# Patient Record
Sex: FEMALE | Race: WHITE | NOT HISPANIC OR LATINO | Employment: OTHER | RURAL
[De-identification: names, ages, dates, MRNs, and addresses within clinical notes are randomized per-mention and may not be internally consistent; named-entity substitution may affect disease eponyms.]

---

## 2018-04-12 ENCOUNTER — OFFICE VISIT CONVERTED (OUTPATIENT)
Dept: CARDIOLOGY | Facility: CLINIC | Age: 63
End: 2018-04-12
Attending: SPECIALIST

## 2018-04-12 ENCOUNTER — CONVERSION ENCOUNTER (OUTPATIENT)
Dept: CARDIOLOGY | Facility: CLINIC | Age: 63
End: 2018-04-12

## 2018-11-08 ENCOUNTER — OFFICE VISIT CONVERTED (OUTPATIENT)
Dept: CARDIOLOGY | Facility: CLINIC | Age: 63
End: 2018-11-08
Attending: SPECIALIST

## 2019-05-09 ENCOUNTER — CONVERSION ENCOUNTER (OUTPATIENT)
Dept: CARDIOLOGY | Facility: CLINIC | Age: 64
End: 2019-05-09

## 2019-05-09 ENCOUNTER — OFFICE VISIT CONVERTED (OUTPATIENT)
Dept: CARDIOLOGY | Facility: CLINIC | Age: 64
End: 2019-05-09
Attending: SPECIALIST

## 2019-11-07 ENCOUNTER — CONVERSION ENCOUNTER (OUTPATIENT)
Dept: CARDIOLOGY | Facility: CLINIC | Age: 64
End: 2019-11-07

## 2019-11-07 ENCOUNTER — OFFICE VISIT CONVERTED (OUTPATIENT)
Dept: CARDIOLOGY | Facility: CLINIC | Age: 64
End: 2019-11-07
Attending: SPECIALIST

## 2020-12-17 ENCOUNTER — OFFICE VISIT CONVERTED (OUTPATIENT)
Dept: CARDIOLOGY | Facility: CLINIC | Age: 65
End: 2020-12-17
Attending: SPECIALIST

## 2021-02-02 ENCOUNTER — CONVERSION ENCOUNTER (OUTPATIENT)
Dept: CARDIOLOGY | Facility: CLINIC | Age: 66
End: 2021-02-02
Attending: SPECIALIST

## 2021-05-10 NOTE — PROCEDURES
"   Procedure Note      Patient Name: Emperatriz Mccarthy   Patient ID: [id number]   Sex: Female   YOB: 1955    Primary Care Provider: Cristin CELESTIN   Referring Provider: Cristin CELESTIN    Visit Date: February 2, 2021    Provider: Gerardo Doty MD   Location: [facility]   Location Address: [address] [place] [address], [address]   OZZY Govea  [id number]   Location Phone: [phone]          FINAL REPORT   TRANSTHORACIC ECHOCARDIOGRAM REPORT    Diagnosis: Aortic Stenosis   Height: 5'5\" Weight: 350 B/P: 116/40 BSA: 2.5   Tech: BNS   MEASUREMENTS:  RVID (Diastole) : RVID. (NORMAL: 0.7 to 2.4 cm max)   LVID (Systole): 3.8 cm (Diastole): 6.5 cm . (NORMAL: 3.7 - 5.4 cm)   Posterior Wall Thickness (Diastole): 1.1 cm. (NORMAL: 0.8 - 1.1 cm)   Septal Thickness (Diastole): 1.1 cm. (NORMAL: 0.7 - 1.2 cm)   LAID (Systole): 4.0 cm. (NORMAL: 1.9 - 3.8 cm)   Aortic Root Diameter (Diastole): 2.4 cm. (NORMAL: 2.0 - 3.7 cm)   DOPPLER:  E/A ratio 1.3 (NORMAL 0.8-2.0)   DT: 201 msec (NORMAL 140-240 msec.)   IVRT 97 m/sec (NORMAL  m/sec.)   E/E': 20 (NORMAL <8 avg.)   COMMENTS:  The patient underwent 2-D, M-Mode, and Doppler examination, including pulse-wave, continuous-wave, and color-flow analysis; the study is technically limited. Lumason contrast was used for better left ventricle endocardial definition. The patient was administered 2 mL of 5 mL Lumason intravenously.   FINDINGS:  AORTIC VALVE: Fibrocalcific.   MITRAL VALVE: Fibrocalcific.   TRICUSPID VALVE: Normal.   PULMONIC VALVE: Not well visualized.   LEFT ATRIUM: Enlarged. No intracavitary masses or clots seen. LA volume index is 35 mL/m2.   AORTIC ROOT: Normal in size with adequate motion.   LEFT VENTRICLE: Mild left ventricular hypertrophy with slightly enlarged left ventricle. Normal left ventricular systolic function. Ejection fraction 60%.   RIGHT ATRIUM: Normal.   RIGHT VENTRICLE: Normal size and function.   PERICARDIUM: Unremarkable. No " evidence of effusion.   INFERIOR VENA CAVA: Diameter is 1.5 cm.   DOPPLER: Doppler examination of the aortic, mitral, tricuspid, and pulmonary valves was performed. Normal pulmonary artery systolic pressure by Doppler. There is increased velocity across the aortic valve with peak gradient of 42 mm, mean gradient of 24 mm suggestive of moderately severe aortic stenosis. There is mild mitral regurgitation and mild tricuspid regurgitation.   Faxed: 02/03/2021      CONCLUSION:  1.  Left ventricular hypertrophy with slightly enlarged left ventricle and normal left ventricular systolic function.   2.  Moderately severe aortic stenosis.  3.  Mild aortic regurgitation.   4.  Mild mitral regurgitation.   5.  Enlarged left atrium.      Gerardo Doty MD  KAYLA/pap                 Electronically Signed by: Mira Adamson-, Other -Author on February 3, 2021 10:58:49 AM  Electronically Co-signed by: Gerardo Doty MD -Reviewer on February 8, 2021 09:08:45 AM

## 2021-05-14 VITALS
SYSTOLIC BLOOD PRESSURE: 116 MMHG | BODY MASS INDEX: 48.82 KG/M2 | DIASTOLIC BLOOD PRESSURE: 40 MMHG | HEIGHT: 65 IN | WEIGHT: 293 LBS | HEART RATE: 82 BPM

## 2021-05-14 NOTE — PROGRESS NOTES
Progress Note      Patient Name: Emperatriz Mccarthy   Patient ID: [id number]   Sex: Female   YOB: 1955    Primary Care Provider: Cristin CELESTIN   Referring Provider: Cristin CELESTIN    Visit Date: December 17, 2020    Provider: Gerardo Doty MD   Location: [facility]   Location Address: [address] [place] [address]  [id number]   Location Phone: [phone]          Chief Complaint  · Valvular heart disease  · Aortic valve stenosis  · Shortness of breath   · Morbid obesity      History Of Present Illness  Emperatriz Mccarthy is a 65 year old obese /White female with a history of aortic valve stenosis; palpitations. Patient denies chest pain. Shortness of breath is present but stable. Recent echocardiogram in [facility] is sub-optimal and a ABILIO was suggested .   Medications  Meds at present include: Allopurinol 300 mg qd; Vitamin D-2-50,000 units q-week; Diltiazem  mg qd; Escitalopram 20 mg qd; Glipizide 10 mg 2 tablets qpm; Lisinopril 5 mg qd; Magnesium Oxide 400 mg bid; Montelukast 10 mg qd; Potassium Chloride 10 meq qd; Atorvastatin 20 mg qd; Torsemide 100 mg 1/2 tablet qd; Metformin 1000 mg bid; Januvia 100 mg qd; Trazodone 50 mg; Amitriptyline 10 mg; Oxcarbazepine 150 mg 2 tablets qhs. C-Pap prn; B-12 vitamin 1000 mg; Levothyroxine 25 mcg qam; ASA 81 mg qd.   PAST MEDICAL HISTORY: positive for diabetes and hypertension; negative for chest pain.   PSYCHO/SOCIAL HISTORY: does not drink alcohol and does not smoke.       Review of Systems  · Cardiovascular  o Admits  o : shortness of breath  o Denies  o : palpitations (fast, fluttering, or skipping beats), swelling (feet, ankles, hands), chest pain or angina pectoris  · Respiratory  o Denies  o : chronic or frequent cough, asthma or wheezing      Vitals  Date Time BP Position Site L\R Cuff Size HR RR TEMP (F) WT  HT  BMI kg/m2 BSA m2 O2 Sat FR L/min FiO2 HC       12/17/2020 11:34 AM  "116/40 Sitting    82 - R   349lbs 16oz 5'  5\" 58.24 2.7             Physical Examination  · Constitutional  o Appearance  o : Alert and Oriented X3. The patient is morbidly obese.  · Respiratory  o Inspection of Chest  o : No chest wall deformities, moving equal  o Auscultation of Lungs  o : Good air entry with vesicular breath sounds  · Cardiovascular  o Heart  o :   § Auscultation of Heart  § : S1 and S2 are regular. No S3. No S4. Systolic murmur grade 3/6 present in the aotic area.   o Peripheral Vascular System  o :   § Extremities  § : Peripheral pulses were well felt. No edema. No cyanosis.  · Gastrointestinal  o Abdominal Examination  o : No masses or tenderness noted.           Assessment     IMPRESSION/PLAN    1. Morbid obesity. I asked her to be on a low fat diet and try to lose weight.  2. Nonrheumatic aortic valve stenosis. Will repeat an Echocardiogram and see if we can get a technical adequate study to avoid Transesophageal Echocardiogram. If it is still technically limited she will have to undergo a Transesophageal Echocardiogram to evaluate her aortic valve.   3. Essential hypertension controlled. Continue current dose of Lisinopril managed by her PMD.  4. Type 2 diabetes with complications. Continue current dose of Glipizide managed by her PMD.  5. See me back in 6 months.    MD KAYLA Lyon/wt       Plan  · Instructions  o This note was transcribed by Conchita Zhu. KAYLA/wt  o The above service was transcribed by Conchita Zhu on my behalf and I attest to the accuracy of the note. KAYLA            Electronically Signed by: Leigh Zhu-, -Author on December 18, 2020 08:26:08 AM  Electronically Co-signed by: Gerardo Doty MD -Reviewer on December 18, 2020 12:18:49 PM  "

## 2021-05-15 VITALS
WEIGHT: 293 LBS | HEART RATE: 71 BPM | BODY MASS INDEX: 48.82 KG/M2 | SYSTOLIC BLOOD PRESSURE: 135 MMHG | HEIGHT: 65 IN | DIASTOLIC BLOOD PRESSURE: 53 MMHG

## 2021-05-15 VITALS
WEIGHT: 293 LBS | HEIGHT: 65 IN | SYSTOLIC BLOOD PRESSURE: 124 MMHG | BODY MASS INDEX: 48.82 KG/M2 | DIASTOLIC BLOOD PRESSURE: 65 MMHG | HEART RATE: 67 BPM

## 2021-05-16 VITALS
HEIGHT: 65 IN | HEART RATE: 77 BPM | SYSTOLIC BLOOD PRESSURE: 106 MMHG | WEIGHT: 293 LBS | BODY MASS INDEX: 48.82 KG/M2 | DIASTOLIC BLOOD PRESSURE: 58 MMHG

## 2021-05-16 VITALS
BODY MASS INDEX: 48.82 KG/M2 | HEIGHT: 65 IN | DIASTOLIC BLOOD PRESSURE: 72 MMHG | HEART RATE: 68 BPM | WEIGHT: 293 LBS | SYSTOLIC BLOOD PRESSURE: 132 MMHG

## 2021-07-13 PROBLEM — I35.0 NONRHEUMATIC AORTIC VALVE STENOSIS: Status: ACTIVE | Noted: 2021-07-13

## 2021-07-13 PROBLEM — E66.01 MORBID OBESITY (HCC): Status: ACTIVE | Noted: 2021-07-13

## 2021-07-13 PROBLEM — I10 HYPERTENSION, ESSENTIAL: Status: ACTIVE | Noted: 2021-07-13

## 2021-07-14 NOTE — PROGRESS NOTES
[facility]  Cardiology progress Note    Patient Name: Emperatriz Mccarthy  : 1955    CC: Valvular heart disease, shortness of breath    Subjective   Subjective       HPI:  Emperatriz Mccarthy is a 66 y.o. female with history of moderately severe aortic valve stenosis.  Denies any chest pain.  Shortness of breath is stable.    Review of Systems:   Constitutional no fever,  no weight loss   Skin no rash   Otolaryngeal no difficulty swallowing   Cardiovascular See HPI   Pulmonary no cough, no sputum production   Gastrointestinal no constipation, no diarrhea   Genitourinary no dysuria, no hematuria   Hematologic no easy bruisability, no abnormal bleeding   Musculoskeletal no muscle pain   Neurologic no dizziness, no falls         Personal History     Social History:  reports that she has never smoked. She has never used smokeless tobacco. She reports that she does not drink alcohol and does not use drugs.    Home Medications:  Current Outpatient Medications on File Prior to Visit   Medication Sig   • allopurinol (ZYLOPRIM) 300 MG tablet Take 300 mg by mouth 2 (Two) Times a Day.   • amitriptyline (ELAVIL) 10 MG tablet Take 10 mg by mouth Every Night.   • aspirin 81 MG EC tablet Take 81 mg by mouth Daily.   • atorvastatin (LIPITOR) 20 MG tablet Take 20 mg by mouth Daily.   • dilTIAZem CD (CARDIZEM CD) 240 MG 24 hr capsule Take 240 mg by mouth Daily.   • Dulaglutide (Trulicity) 1.5 MG/0.5ML solution pen-injector Inject  under the skin into the appropriate area as directed.   • escitalopram (LEXAPRO) 20 MG tablet Take 20 mg by mouth Daily.   • glipizide (GLUCOTROL) 10 MG tablet Take 10 mg by mouth 2 (Two) Times a Day Before Meals.   • levothyroxine (SYNTHROID, LEVOTHROID) 25 MCG tablet Take 25 mcg by mouth Daily.   • lisinopril (PRINIVIL,ZESTRIL) 5 MG tablet Take 5 mg by mouth Daily.   • metFORMIN (GLUCOPHAGE) 1000 MG tablet Take 1,000 mg by mouth 2 (Two) Times a Day With Meals.   • montelukast (SINGULAIR) 10 MG  tablet Take 10 mg by mouth Every Night.   • OXcarbazepine (TRILEPTAL) 150 MG tablet Take 150 mg by mouth Daily.   • SITagliptin (JANUVIA) 100 MG tablet Take 100 mg by mouth Daily.   • torsemide (DEMADEX) 100 MG tablet Take 100 mg by mouth Daily.   • traZODone (DESYREL) 100 MG tablet Take 100 mg by mouth Every Night.   • vitamin B-12 (CYANOCOBALAMIN) 1000 MCG tablet Take 1,000 mcg by mouth Daily.     No current facility-administered medications on file prior to visit.     Allergies:  No Known Allergies    Objective    Objective       Vitals:   Heart Rate:  [66] 66  BP: (139)/(72) 139/72  Body mass index is 50.92 kg/m².     Physical Exam:   Constitutional: Awake, alert, No acute distress    Eyes: PERRLA, sclerae anicteric, no conjunctival injection   HENT: NCAT, mucous membranes moist   Neck: Supple, no thyromegaly, no lymphadenopathy, trachea midline   Respiratory: Clear to auscultation bilaterally, nonlabored respirations    Cardiovascular: RRR, systolic murmur grade 3 x 6 present in the aortic area, rubs, or gallops, palpable pedal pulses bilaterally   Gastrointestinal: Positive bowel sounds, soft, nontender, nondistended   Musculoskeletal: No bilateral ankle edema, no clubbing or cyanosis to extremities   Psychiatric: Appropriate affect, cooperative   Neurologic: Oriented x 3, strength symmetric in all extremities, Cranial Nerves grossly intact to confrontation, speech clear   Skin: No rashes.    Result Review    Result Review:  I have personally reviewed the available results from  [x]  Laboratory  [x]  EKG  [x]  Cardiology  [x]  Medications  [x]  Old records  []  Other:   Procedures  No results found for: CHOL  No results found for: TRIG  No results found for: HDL  No results found for: LDL  No results found for: VLDL      Impression/Plan:  1.  Nonrheumatic aortic valve stenosis moderately severe.  Shortness of breath stable.  Repeat echocardiogram in 6 months.  2.  Essential hypertension controlled: Continue  current dose of lisinopril.  Low-salt diet advised.  3.  Morbid obesity: Low-fat diet, regular exercise advised.             Electronically signed by Gerardo Doty MD, 07/13/21, 8:51 PM EDT.

## 2021-07-15 ENCOUNTER — OFFICE VISIT (OUTPATIENT)
Dept: CARDIOLOGY | Facility: CLINIC | Age: 66
End: 2021-07-15

## 2021-07-15 VITALS
WEIGHT: 293 LBS | DIASTOLIC BLOOD PRESSURE: 72 MMHG | SYSTOLIC BLOOD PRESSURE: 139 MMHG | HEIGHT: 65 IN | BODY MASS INDEX: 48.82 KG/M2 | HEART RATE: 66 BPM

## 2021-07-15 DIAGNOSIS — I10 HYPERTENSION, ESSENTIAL: ICD-10-CM

## 2021-07-15 DIAGNOSIS — E66.01 MORBID OBESITY (HCC): ICD-10-CM

## 2021-07-15 DIAGNOSIS — I35.0 NONRHEUMATIC AORTIC VALVE STENOSIS: Primary | ICD-10-CM

## 2021-07-15 PROCEDURE — 99214 OFFICE O/P EST MOD 30 MIN: CPT | Performed by: SPECIALIST

## 2021-07-15 RX ORDER — TORSEMIDE 100 MG/1
100 TABLET ORAL DAILY
COMMUNITY

## 2021-07-15 RX ORDER — DILTIAZEM HYDROCHLORIDE 240 MG/1
240 CAPSULE, COATED, EXTENDED RELEASE ORAL DAILY
COMMUNITY

## 2021-07-15 RX ORDER — LEVOTHYROXINE SODIUM 0.03 MG/1
25 TABLET ORAL DAILY
COMMUNITY

## 2021-07-15 RX ORDER — ASPIRIN 81 MG/1
81 TABLET ORAL DAILY
COMMUNITY

## 2021-07-15 RX ORDER — OXCARBAZEPINE 150 MG/1
150 TABLET, FILM COATED ORAL DAILY
COMMUNITY

## 2021-07-15 RX ORDER — GLIPIZIDE 10 MG/1
10 TABLET ORAL
COMMUNITY

## 2021-07-15 RX ORDER — LANOLIN ALCOHOL/MO/W.PET/CERES
1000 CREAM (GRAM) TOPICAL DAILY
COMMUNITY

## 2021-07-15 RX ORDER — AMITRIPTYLINE HYDROCHLORIDE 10 MG/1
10 TABLET, FILM COATED ORAL NIGHTLY
COMMUNITY

## 2021-07-15 RX ORDER — DULAGLUTIDE 1.5 MG/.5ML
INJECTION, SOLUTION SUBCUTANEOUS
COMMUNITY

## 2021-07-15 RX ORDER — MONTELUKAST SODIUM 10 MG/1
10 TABLET ORAL NIGHTLY
COMMUNITY

## 2021-07-15 RX ORDER — TRAZODONE HYDROCHLORIDE 100 MG/1
100 TABLET ORAL NIGHTLY
COMMUNITY

## 2021-07-15 RX ORDER — ESCITALOPRAM OXALATE 20 MG/1
20 TABLET ORAL DAILY
COMMUNITY

## 2021-07-15 RX ORDER — ALLOPURINOL 300 MG/1
300 TABLET ORAL 2 TIMES DAILY
COMMUNITY

## 2021-07-15 RX ORDER — LISINOPRIL 5 MG/1
5 TABLET ORAL DAILY
COMMUNITY

## 2021-07-15 RX ORDER — ATORVASTATIN CALCIUM 20 MG/1
20 TABLET, FILM COATED ORAL DAILY
COMMUNITY

## 2022-04-24 NOTE — PROGRESS NOTES
[facility]  Cardiology progress Note    Patient Name: Emperatriz Mccarthy  : 1955    CHIEF COMPLAINT  Valvular heart disease, hypertension.      Subjective   Subjective     HISTORY OF PRESENT ILLNESS    Emperatriz Mccarthy is a 67 y.o. female with history of valvular heart disease and hypertension.  No chest pain or shortness of breath.    Review of Systems:   Constitutional no fever,  no weight loss   Skin no rash   Otolaryngeal no difficulty swallowing   Cardiovascular See HPI   Pulmonary no cough, no sputum production   Gastrointestinal no constipation, no diarrhea   Genitourinary no dysuria, no hematuria   Hematologic no easy bruisability, no abnormal bleeding   Musculoskeletal no muscle pain   Neurologic no dizziness, no falls         Personal History     Social History:  reports that she has never smoked. She has never used smokeless tobacco. She reports that she does not drink alcohol and does not use drugs.    Home Medications:  Current Outpatient Medications on File Prior to Visit   Medication Sig   • allopurinol (ZYLOPRIM) 300 MG tablet Take 300 mg by mouth 2 (Two) Times a Day.   • amitriptyline (ELAVIL) 10 MG tablet Take 10 mg by mouth Every Night.   • aspirin 81 MG EC tablet Take 81 mg by mouth Daily.   • atorvastatin (LIPITOR) 20 MG tablet Take 20 mg by mouth Daily.   • dilTIAZem CD (CARDIZEM CD) 240 MG 24 hr capsule Take 240 mg by mouth Daily.   • Dulaglutide (Trulicity) 1.5 MG/0.5ML solution pen-injector Inject  under the skin into the appropriate area as directed.   • escitalopram (LEXAPRO) 20 MG tablet Take 20 mg by mouth Daily.   • glipizide (GLUCOTROL) 10 MG tablet Take 10 mg by mouth 2 (Two) Times a Day Before Meals.   • levothyroxine (SYNTHROID, LEVOTHROID) 25 MCG tablet Take 25 mcg by mouth Daily.   • lisinopril (PRINIVIL,ZESTRIL) 5 MG tablet Take 5 mg by mouth Daily.   • metFORMIN (GLUCOPHAGE) 1000 MG tablet Take 1,000 mg by mouth 2 (Two) Times a Day With Meals.   • montelukast  (SINGULAIR) 10 MG tablet Take 10 mg by mouth Every Night.   • OXcarbazepine (TRILEPTAL) 150 MG tablet Take 150 mg by mouth Daily.   • SITagliptin (JANUVIA) 100 MG tablet Take 100 mg by mouth Daily.   • torsemide (DEMADEX) 100 MG tablet Take 100 mg by mouth Daily.   • traZODone (DESYREL) 100 MG tablet Take 100 mg by mouth Every Night.   • vitamin B-12 (CYANOCOBALAMIN) 1000 MCG tablet Take 1,000 mcg by mouth Daily.     No current facility-administered medications on file prior to visit.     Allergies:  No Known Allergies    Objective    Objective       Vitals:   Heart Rate:  [68-70] 68  BP: (142-144)/(46-48) 144/46  Body mass index is 56.25 kg/m².     Physical Exam:   Constitutional: Awake, alert, No acute distress    Eyes: PERRLA, sclerae anicteric, no conjunctival injection   HENT: NCAT, mucous membranes moist   Neck: Supple, no thyromegaly, no lymphadenopathy, trachea midline   Respiratory: Clear to auscultation bilaterally, nonlabored respirations    Cardiovascular: RRR, SM GR 3/6 AA. Palpable pedal pulses bilaterally   Musculoskeletal: No bilateral ankle edema, no cyanosis to extremities   Psychiatric: Appropriate affect, cooperative   Neurologic: Oriented x 3, strength symmetric in all extremities, Cranial Nerves grossly intact to confrontation, speech clear   Skin: No rashes.    Result Review    Result Review:  I have personally reviewed the available results from  [x]  Laboratory  [x]  EKG  [x]  Cardiology  [x]  Medications  [x]  Old records  []  Other:   Procedures  Results for orders placed in visit on 04/19/22    Adult Transthoracic Echo Complete W/ Cont if Necessary Per Protocol    Interpretation Summary  Fibrocalcific mitral and aortic valves.  Normal left ventricular systolic function.  Moderate aortic stenosis.  Mean gradient of 22.  Mild aortic regurgitation.  Mild mitral regurgitation.     Impression/Plan:  1.  Nonrheumatic aortic valve stenosis moderately severe: Asymptomatic.  2.  Essential  hypertension controlled: Continue lisinopril 5 mg a day.  Blood pressure controlled at home.  3.  Hypothyroidism: Continue Synthroid 25 mcg once a day.  4.  Chronic diastolic heart failure stable: Continue Demadex 100 mg a day.  5.  Hyperlipidemia: Continue Lipitor 20 mg a day.  Lipid profile reviewed.  6.  Morbid obesity: Low-fat diet and exercise advised.      Gerardo Doty MD   04/25/22   12:16 EDT

## 2022-04-25 ENCOUNTER — OFFICE VISIT (OUTPATIENT)
Dept: CARDIOLOGY | Facility: CLINIC | Age: 67
End: 2022-04-25

## 2022-04-25 VITALS
BODY MASS INDEX: 48.82 KG/M2 | SYSTOLIC BLOOD PRESSURE: 144 MMHG | DIASTOLIC BLOOD PRESSURE: 46 MMHG | WEIGHT: 293 LBS | HEIGHT: 65 IN | HEART RATE: 68 BPM

## 2022-04-25 DIAGNOSIS — I35.0 NONRHEUMATIC AORTIC VALVE STENOSIS: Primary | ICD-10-CM

## 2022-04-25 DIAGNOSIS — E78.2 HYPERLIPEMIA, MIXED: ICD-10-CM

## 2022-04-25 DIAGNOSIS — I10 HYPERTENSION, ESSENTIAL: ICD-10-CM

## 2022-04-25 DIAGNOSIS — I50.32 DIASTOLIC CHF, CHRONIC: ICD-10-CM

## 2022-04-25 DIAGNOSIS — E66.01 MORBID OBESITY: ICD-10-CM

## 2022-04-25 PROCEDURE — 99214 OFFICE O/P EST MOD 30 MIN: CPT | Performed by: SPECIALIST

## 2022-11-18 NOTE — PROGRESS NOTES
[facility]  Cardiology progress Note    Patient Name: Emperatriz Mccarthy  : 1955    CHIEF COMPLAINT  Hypertension        Subjective   Subjective     HISTORY OF PRESENT ILLNESS    Emperatriz Mccarthy is a 67 y.o. female with history of hypertension and CHF.  No chest pain or shortness of breath.    REVIEW OF SYSTEMS    Constitutional:    No fever, no weight loss  Skin:     No rash  Otolaryngeal:    No difficulty swallowing  Cardiovascular: See HPI.  Pulmonary:    No cough, no sputum production    Personal History     Social History:    reports that she has never smoked. She has never used smokeless tobacco. She reports that she does not drink alcohol and does not use drugs.    Home Medications:  Current Outpatient Medications on File Prior to Visit   Medication Sig   • allopurinol (ZYLOPRIM) 300 MG tablet Take 300 mg by mouth 2 (Two) Times a Day.   • amitriptyline (ELAVIL) 10 MG tablet Take 10 mg by mouth Every Night.   • aspirin 81 MG EC tablet Take 81 mg by mouth Daily.   • atorvastatin (LIPITOR) 20 MG tablet Take 20 mg by mouth Daily.   • dilTIAZem CD (CARDIZEM CD) 240 MG 24 hr capsule Take 240 mg by mouth Daily.   • Dulaglutide (Trulicity) 1.5 MG/0.5ML solution pen-injector Inject  under the skin into the appropriate area as directed.   • escitalopram (LEXAPRO) 20 MG tablet Take 20 mg by mouth Daily.   • glipizide (GLUCOTROL) 10 MG tablet Take 10 mg by mouth 2 (Two) Times a Day Before Meals.   • levothyroxine (SYNTHROID, LEVOTHROID) 25 MCG tablet Take 25 mcg by mouth Daily.   • lisinopril (PRINIVIL,ZESTRIL) 5 MG tablet Take 5 mg by mouth Daily.   • metFORMIN (GLUCOPHAGE) 1000 MG tablet Take 1,000 mg by mouth 2 (Two) Times a Day With Meals.   • montelukast (SINGULAIR) 10 MG tablet Take 10 mg by mouth Every Night.   • OXcarbazepine (TRILEPTAL) 150 MG tablet Take 150 mg by mouth Daily.   • SITagliptin (JANUVIA) 100 MG tablet Take 100 mg by mouth Daily.   • torsemide (DEMADEX) 100 MG tablet Take 100 mg by  mouth Daily.   • traZODone (DESYREL) 100 MG tablet Take 100 mg by mouth Every Night.   • vitamin B-12 (CYANOCOBALAMIN) 1000 MCG tablet Take 1,000 mcg by mouth Daily.     No current facility-administered medications on file prior to visit.       Past Medical History:   Diagnosis Date   • Asthma    • Degeneration of lumbar intervertebral disc 12/07/2017   • Diabetes mellitus (HCC)    • Heart murmur    • Hyperlipidemia    • Hypertension    • Leg paresthesia 12/07/2017   • Leg weakness, bilateral 12/07/2017   • Lumbago 12/07/2017    Low back pain   • Lumbar spinal stenosis 12/07/2017    severe at L3/4   • Palpitations 03/13/2017   • Pre-op examination 12/07/2017   • Sleep apnea        Allergies:  No Known Allergies    Objective    Objective       Vitals:   Heart Rate:  [75] 75  BP: (90)/(65) 90/65  Body mass index is 58.08 kg/m².     PHYSICAL EXAM:    General Appearance:   · well developed  · well nourished  HENT:   · oropharynx moist  · lips not cyanotic  Neck:  · thyroid not enlarged  · supple  Respiratory:  · no respiratory distress  · normal breath sounds  · no rales  Cardiovascular:  · no jugular venous distention  · regular rhythm  · apical impulse normal  · S1 normal, S2 normal  · no S3, no S4   · SM GR 3/6 AA  · no rub, no thrill  · carotid pulses normal; no bruit  · pedal pulses normal  · lower extremity edema: none    Skin:   · warm, dry  Psychiatric:  · judgement and insight appropriate  · normal mood and affect        Result Review:  I have personally reviewed the available results from  [x]  Laboratory  [x]  EKG  [x]  Cardiology  [x]  Medications  [x]  Old records  []  Other:     Procedures  Results for orders placed in visit on 04/19/22    Adult Transthoracic Echo Complete W/ Cont if Necessary Per Protocol    Interpretation Summary  Fibrocalcific mitral and aortic valves.  Normal left ventricular systolic function.  Moderate aortic stenosis.  Mean gradient of 22.  Mild aortic regurgitation.  Mild mitral  regurgitation.     Impression/Plan:  1.  Essential hypertension controlled: Continue lisinopril 5 mg a day.  Blood pressure controlled at home.  2.  Chronic diastolic heart failure stable: Continue Demadex 100 mg a day.  Monitor renal function  3.  Hyperlipidemia: Currently Lipitor 20 mg a day.  Lipid profile reviewed.  4.  Nonrheumatic moderately severe aortic stenosis: Asymptomatic.  5.  Morbid obesity: Low-fat diet and exercise advised.           Gerardo Doty MD   11/21/22   10:48 EST

## 2022-11-21 ENCOUNTER — OFFICE VISIT (OUTPATIENT)
Dept: CARDIOLOGY | Facility: CLINIC | Age: 67
End: 2022-11-21

## 2022-11-21 VITALS
SYSTOLIC BLOOD PRESSURE: 90 MMHG | HEART RATE: 75 BPM | DIASTOLIC BLOOD PRESSURE: 65 MMHG | BODY MASS INDEX: 58.08 KG/M2 | WEIGHT: 293 LBS

## 2022-11-21 DIAGNOSIS — I10 HYPERTENSION, ESSENTIAL: Primary | ICD-10-CM

## 2022-11-21 DIAGNOSIS — I35.0 NONRHEUMATIC AORTIC VALVE STENOSIS: ICD-10-CM

## 2022-11-21 DIAGNOSIS — E78.2 HYPERLIPEMIA, MIXED: ICD-10-CM

## 2022-11-21 DIAGNOSIS — I50.32 DIASTOLIC CHF, CHRONIC: ICD-10-CM

## 2022-11-21 PROCEDURE — 99214 OFFICE O/P EST MOD 30 MIN: CPT | Performed by: SPECIALIST

## 2023-06-06 NOTE — PROGRESS NOTES
[facility]  Cardiology progress Note    Patient Name: Emperatriz Mccarthy  : 1955    CHIEF COMPLAINT  Hypertension        Subjective   Subjective     HISTORY OF PRESENT ILLNESS    Emperatriz Mccarthy is a 68 y.o. female with history of hypertension and CHF.  No chest pain or shortness of breath    REVIEW OF SYSTEMS    Constitutional:    No fever, no weight loss  Skin:     No rash  Otolaryngeal:    No difficulty swallowing  Cardiovascular: See HPI.  Pulmonary:    No cough, no sputum production    Personal History     Social History:    reports that she has never smoked. She has never used smokeless tobacco. She reports that she does not drink alcohol and does not use drugs.    Home Medications:  Current Outpatient Medications on File Prior to Visit   Medication Sig    allopurinol (ZYLOPRIM) 300 MG tablet Take 1 tablet by mouth 2 (Two) Times a Day.    amitriptyline (ELAVIL) 10 MG tablet Take 1 tablet by mouth Every Night.    aspirin 81 MG EC tablet Take 1 tablet by mouth Daily.    atorvastatin (LIPITOR) 20 MG tablet Take 1 tablet by mouth Daily.    dilTIAZem CD (CARDIZEM CD) 240 MG 24 hr capsule Take 1 capsule by mouth Daily.    Dulaglutide (Trulicity) 1.5 MG/0.5ML solution pen-injector Inject  under the skin into the appropriate area as directed.    escitalopram (LEXAPRO) 20 MG tablet Take 1 tablet by mouth Daily.    glipizide (GLUCOTROL) 10 MG tablet Take 1 tablet by mouth 2 (Two) Times a Day Before Meals.    levothyroxine (SYNTHROID, LEVOTHROID) 25 MCG tablet Take 1 tablet by mouth Daily.    lisinopril (PRINIVIL,ZESTRIL) 5 MG tablet Take 1 tablet by mouth Daily.    metFORMIN (GLUCOPHAGE) 1000 MG tablet Take 1 tablet by mouth 2 (Two) Times a Day With Meals.    montelukast (SINGULAIR) 10 MG tablet Take 1 tablet by mouth Every Night.    OXcarbazepine (TRILEPTAL) 150 MG tablet Take 1 tablet by mouth Daily.    SITagliptin (JANUVIA) 100 MG tablet Take 1 tablet by mouth Daily.    traZODone (DESYREL) 100 MG tablet  Take 1 tablet by mouth Every Night.    vitamin B-12 (CYANOCOBALAMIN) 1000 MCG tablet Take 1 tablet by mouth Daily.    [DISCONTINUED] torsemide (DEMADEX) 100 MG tablet Take 1 tablet by mouth Daily.     No current facility-administered medications on file prior to visit.       Past Medical History:   Diagnosis Date    Asthma     Degeneration of lumbar intervertebral disc 12/07/2017    Diabetes mellitus     Heart murmur     Hyperlipidemia     Hypertension     Leg paresthesia 12/07/2017    Leg weakness, bilateral 12/07/2017    Lumbago 12/07/2017    Low back pain    Lumbar spinal stenosis 12/07/2017    severe at L3/4    Palpitations 03/13/2017    Pre-op examination 12/07/2017    Sleep apnea        Allergies:  No Known Allergies    Objective    Objective       Vitals:   Heart Rate:  [72] 72  BP: (128)/(66) 128/66  Body mass index is 55.08 kg/m².     PHYSICAL EXAM:    General Appearance:   well developed  well nourished  HENT:   oropharynx moist  lips not cyanotic  Neck:  thyroid not enlarged  supple  Respiratory:  no respiratory distress  normal breath sounds  no rales  Cardiovascular:  no jugular venous distention  regular rhythm  apical impulse normal  S1 normal, S2 normal  no S3, no S4   SM GR 2/6 AA  no rub, no thrill  carotid pulses normal; no bruit  pedal pulses normal  lower extremity edema: none    Skin:   warm, dry  Psychiatric:  judgement and insight appropriate  normal mood and affect        Result Review:  I have personally reviewed the available results from  [x]  Laboratory  [x]  EKG  [x]  Cardiology  [x]  Medications  [x]  Old records  []  Other:     Procedures    Results for orders placed in visit on 04/19/22    Adult Transthoracic Echo Complete W/ Cont if Necessary Per Protocol    Interpretation Summary  Fibrocalcific mitral and aortic valves.  Normal left ventricular systolic function.  Moderate aortic stenosis.  Mean gradient of 22.  Mild aortic regurgitation.  Mild mitral regurgitation.      Impression/Plan:  1.  Chronic diastolic heart failure stable: Continue Demadex 50 mg once a day.  Monitor renal function.  2.  Hyperlipidemia: Continue Lipitor 20 mg once a day.  Monitor lipid profile.  3.  Essential hypertension controlled: Continue lisinopril 5 mg once a day.  Continue Cardizem  mg once a day.  Monitor blood pressure regularly.  4.  Moderate aortic stenosis: Asymptomatic.           Gerardo Doty MD   06/08/23   11:17 EDT

## 2023-06-08 ENCOUNTER — OFFICE VISIT (OUTPATIENT)
Dept: CARDIOLOGY | Facility: CLINIC | Age: 68
End: 2023-06-08
Payer: MEDICARE

## 2023-06-08 VITALS
HEIGHT: 65 IN | SYSTOLIC BLOOD PRESSURE: 128 MMHG | DIASTOLIC BLOOD PRESSURE: 66 MMHG | BODY MASS INDEX: 48.82 KG/M2 | WEIGHT: 293 LBS | HEART RATE: 72 BPM

## 2023-06-08 DIAGNOSIS — I10 HYPERTENSION, ESSENTIAL: Primary | ICD-10-CM

## 2023-06-08 DIAGNOSIS — E78.2 HYPERLIPEMIA, MIXED: ICD-10-CM

## 2023-06-08 DIAGNOSIS — I50.32 DIASTOLIC CHF, CHRONIC: ICD-10-CM

## 2023-06-08 RX ORDER — TORSEMIDE 100 MG/1
50 TABLET ORAL DAILY
Qty: 90 TABLET | Refills: 4 | Status: SHIPPED | OUTPATIENT
Start: 2023-06-08

## 2024-01-19 NOTE — PROGRESS NOTES
[facility]  Cardiology progress Note    Patient Name: Emperatriz Mccarthy  : 1955    CHIEF COMPLAINT  Hypertension        Subjective   Subjective     HISTORY OF PRESENT ILLNESS    Emperatriz Mccarthy is a 68 y.o. female with history of hypertension and CHF.  No chest pain or shortness of breath    REVIEW OF SYSTEMS    Constitutional:    No fever, no weight loss  Skin:     No rash  Otolaryngeal:    No difficulty swallowing  Cardiovascular: See HPI.  Pulmonary:    No cough, no sputum production    Personal History     Social History:    reports that she has never smoked. She has never used smokeless tobacco. She reports that she does not drink alcohol and does not use drugs.    Home Medications:  Current Outpatient Medications on File Prior to Visit   Medication Sig    allopurinol (ZYLOPRIM) 300 MG tablet Take 1 tablet by mouth 2 (Two) Times a Day.    amitriptyline (ELAVIL) 10 MG tablet Take 1 tablet by mouth Every Night.    aspirin 81 MG EC tablet Take 1 tablet by mouth Daily.    atorvastatin (LIPITOR) 20 MG tablet Take 1 tablet by mouth Daily.    dilTIAZem CD (CARDIZEM CD) 240 MG 24 hr capsule Take 1 capsule by mouth Daily.    Dulaglutide (Trulicity) 1.5 MG/0.5ML solution pen-injector Inject  under the skin into the appropriate area as directed.    escitalopram (LEXAPRO) 20 MG tablet Take 1 tablet by mouth Daily.    glipizide (GLUCOTROL) 10 MG tablet Take 1 tablet by mouth 2 (Two) Times a Day Before Meals.    levothyroxine (SYNTHROID, LEVOTHROID) 25 MCG tablet Take 1 tablet by mouth Daily.    lisinopril (PRINIVIL,ZESTRIL) 5 MG tablet Take 1 tablet by mouth Daily.    metFORMIN (GLUCOPHAGE) 1000 MG tablet Take 1 tablet by mouth 2 (Two) Times a Day With Meals.    montelukast (SINGULAIR) 10 MG tablet Take 1 tablet by mouth Every Night.    OXcarbazepine (TRILEPTAL) 150 MG tablet Take 1 tablet by mouth Daily.    SITagliptin (JANUVIA) 100 MG tablet Take 1 tablet by mouth Daily.    torsemide (DEMADEX) 100 MG tablet  Take 0.5 tablets by mouth Daily.    traZODone (DESYREL) 100 MG tablet Take 1 tablet by mouth Every Night.    vitamin B-12 (CYANOCOBALAMIN) 1000 MCG tablet Take 1 tablet by mouth Daily.     No current facility-administered medications on file prior to visit.       Past Medical History:   Diagnosis Date    Asthma     Degeneration of lumbar intervertebral disc 12/07/2017    Diabetes mellitus     Heart murmur     Hyperlipidemia     Hypertension     Leg paresthesia 12/07/2017    Leg weakness, bilateral 12/07/2017    Lumbago 12/07/2017    Low back pain    Lumbar spinal stenosis 12/07/2017    severe at L3/4    Palpitations 03/13/2017    Pre-op examination 12/07/2017    Sleep apnea        Allergies:  No Known Allergies    Objective    Objective       Vitals:   Heart Rate:  [66] 66  BP: (130)/(54) 130/54  Body mass index is 55.75 kg/m².     PHYSICAL EXAM:    General Appearance:   well developed  well nourished  HENT:   oropharynx moist  lips not cyanotic  Neck:  thyroid not enlarged  supple  Respiratory:  no respiratory distress  normal breath sounds  no rales  Cardiovascular:  no jugular venous distention  regular rhythm  apical impulse normal  S1 normal, S2 normal  no S3, no S4   SM GR 3/6 AA  no rub, no thrill  carotid pulses normal; no bruit  pedal pulses normal  lower extremity edema: none    Skin:   warm, dry  Psychiatric:  judgement and insight appropriate  normal mood and affect        Result Review:  I have personally reviewed the available results from  [x]  Laboratory  [x]  EKG  [x]  Cardiology  [x]  Medications  [x]  Old records  []  Other:     Procedures    Results for orders placed in visit on 04/19/22    Adult Transthoracic Echo Complete W/ Cont if Necessary Per Protocol    Interpretation Summary  Fibrocalcific mitral and aortic valves.  Normal left ventricular systolic function.  Moderate aortic stenosis.  Mean gradient of 22.  Mild aortic regurgitation.  Mild mitral regurgitation.     Impression/Plan:  1.   Essential hypertension controlled: Continue lisinopril 5 mg once a day.  Continue Cardizem CD to 40 mg once a day.  Monitor blood pressure regularly.  2.  Moderate aortic stenosis: Asymptomatic.  3.  Chronic diastolic heart failure: Continue Demadex 50 mg once a day.  Monitor renal function.  4.  Hyperlipidemia: Continue Lipitor 20 mg once a day.  Monitor lipid and hepatic profile.           Gerardo Doty MD   01/22/24   10:32 EST

## 2024-01-22 ENCOUNTER — OFFICE VISIT (OUTPATIENT)
Dept: CARDIOLOGY | Facility: CLINIC | Age: 69
End: 2024-01-22
Payer: MEDICARE

## 2024-01-22 VITALS
DIASTOLIC BLOOD PRESSURE: 54 MMHG | HEART RATE: 66 BPM | WEIGHT: 293 LBS | BODY MASS INDEX: 48.82 KG/M2 | SYSTOLIC BLOOD PRESSURE: 130 MMHG | HEIGHT: 65 IN

## 2024-01-22 DIAGNOSIS — I50.32 DIASTOLIC CHF, CHRONIC: ICD-10-CM

## 2024-01-22 DIAGNOSIS — I10 HYPERTENSION, ESSENTIAL: Primary | ICD-10-CM

## 2024-01-22 DIAGNOSIS — I35.0 NONRHEUMATIC AORTIC VALVE STENOSIS: ICD-10-CM

## 2024-01-22 PROCEDURE — 3078F DIAST BP <80 MM HG: CPT | Performed by: SPECIALIST

## 2024-01-22 PROCEDURE — 99214 OFFICE O/P EST MOD 30 MIN: CPT | Performed by: SPECIALIST

## 2024-01-22 PROCEDURE — 1159F MED LIST DOCD IN RCRD: CPT | Performed by: SPECIALIST

## 2024-01-22 PROCEDURE — 3075F SYST BP GE 130 - 139MM HG: CPT | Performed by: SPECIALIST

## 2024-01-22 PROCEDURE — 1160F RVW MEDS BY RX/DR IN RCRD: CPT | Performed by: SPECIALIST

## 2024-08-04 NOTE — PROGRESS NOTES
[facility]  Cardiology progress Note    Patient Name: Emperatriz Mccarthy  : 1955    CHIEF COMPLAINT  Hypertension        Subjective   Subjective     HISTORY OF PRESENT ILLNESS    Emperatriz Mccarthy is a 69 y.o. female history of hypertension aortic stenosis.  No chest pain or shortness of breath.    REVIEW OF SYSTEMS    Constitutional:    No fever, no weight loss  Skin:     No rash  Otolaryngeal:    No difficulty swallowing  Cardiovascular: See HPI.  Pulmonary:    No cough, no sputum production    Personal History     Social History:    reports that she has never smoked. She has never used smokeless tobacco. She reports that she does not drink alcohol and does not use drugs.    Home Medications:  Current Outpatient Medications on File Prior to Visit   Medication Sig    acetaminophen-codeine (TYLENOL with CODEINE #3) 300-30 MG per tablet     allopurinol (ZYLOPRIM) 300 MG tablet Take 1 tablet by mouth 2 (Two) Times a Day.    amitriptyline (ELAVIL) 10 MG tablet Take 1 tablet by mouth Every Night.    aspirin 81 MG EC tablet Take 1 tablet by mouth Daily.    atorvastatin (LIPITOR) 20 MG tablet Take 1 tablet by mouth Daily.    dilTIAZem CD (CARDIZEM CD) 240 MG 24 hr capsule Take 1 capsule by mouth Daily.    escitalopram (LEXAPRO) 20 MG tablet Take 1 tablet by mouth Daily.    glipizide (GLUCOTROL) 10 MG tablet Take 1 tablet by mouth 2 (Two) Times a Day Before Meals.    HYDROcodone-acetaminophen (NORCO) 7.5-325 MG per tablet Take 1 tablet by mouth.    levothyroxine (SYNTHROID, LEVOTHROID) 25 MCG tablet Take 1 tablet by mouth Daily.    lisinopril (PRINIVIL,ZESTRIL) 5 MG tablet Take 1 tablet by mouth Daily.    montelukast (SINGULAIR) 10 MG tablet Take 1 tablet by mouth Every Night.    Ozempic, 1 MG/DOSE, 4 MG/3ML solution pen-injector Inject 1 Units under the skin into the appropriate area as directed.    SITagliptin (JANUVIA) 100 MG tablet Take 1 tablet by mouth Daily.    torsemide (DEMADEX) 100 MG tablet Take 0.5  tablets by mouth Daily.    traZODone (DESYREL) 100 MG tablet Take 1 tablet by mouth Every Night.    Ventolin  (90 Base) MCG/ACT inhaler     vitamin B-12 (CYANOCOBALAMIN) 1000 MCG tablet Take 1 tablet by mouth Daily.    Voltaren 1 % gel gel apply 2 grams up to 4 x a day prn to affected areas    [DISCONTINUED] Dulaglutide (Trulicity) 1.5 MG/0.5ML solution pen-injector Inject  under the skin into the appropriate area as directed.    [DISCONTINUED] metFORMIN (GLUCOPHAGE) 1000 MG tablet Take 1 tablet by mouth 2 (Two) Times a Day With Meals.    [DISCONTINUED] OXcarbazepine (TRILEPTAL) 150 MG tablet Take 1 tablet by mouth Daily.     No current facility-administered medications on file prior to visit.       Past Medical History:   Diagnosis Date    Asthma     Degeneration of lumbar intervertebral disc 12/07/2017    Diabetes mellitus     Heart murmur     Hyperlipidemia     Hypertension     Leg paresthesia 12/07/2017    Leg weakness, bilateral 12/07/2017    Lumbago 12/07/2017    Low back pain    Lumbar spinal stenosis 12/07/2017    severe at L3/4    Palpitations 03/13/2017    Pre-op examination 12/07/2017    Sleep apnea        Allergies:  No Known Allergies    Objective    Objective       Vitals:   Heart Rate:  [80] 80  BP: (119)/(54) 119/54  Body mass index is 55.41 kg/m².     PHYSICAL EXAM:    General Appearance:   well developed  well nourished  HENT:   oropharynx moist  lips not cyanotic  Neck:  thyroid not enlarged  supple  Respiratory:  no respiratory distress  normal breath sounds  no rales  Cardiovascular:  no jugular venous distention  regular rhythm  apical impulse normal  S1 normal, S2 normal  no S3, no S4   SM GR 3/6 AA  no rub, no thrill  carotid pulses normal; no bruit  pedal pulses normal  lower extremity edema: none    Skin:   warm, dry  Psychiatric:  judgement and insight appropriate  normal mood and affect        Result Review:  I have personally reviewed the available results from  [x]   Laboratory  [x]  EKG  [x]  Cardiology  [x]  Medications  [x]  Old records  []  Other:     Procedures    Results for orders placed in visit on 04/19/22    Adult Transthoracic Echo Complete W/ Cont if Necessary Per Protocol    Interpretation Summary  Fibrocalcific mitral and aortic valves.  Normal left ventricular systolic function.  Moderate aortic stenosis.  Mean gradient of 22.  Mild aortic regurgitation.  Mild mitral regurgitation.     Impression/Plan:  1.  Essential hypertension controlled: Continue lisinopril 5 mg once a day.  Continue Cardizem  mg once a day.  Monitor blood pressure regularly.  2.  Moderate aortic stenosis: Asymptomatic.  Repeat echocardiogram in 6 months.  3.  Chronic diastolic heart failure: Continue Demadex 50 mg once a day.  Monitor renal function.  4.  Mixed hyperlipidemia: Continue Lipitor 20 mg once a day.  Monitor lipid and hepatic profile.           Gerardo Doty MD   08/05/24   11:14 EDT

## 2024-08-05 ENCOUNTER — OFFICE VISIT (OUTPATIENT)
Dept: CARDIOLOGY | Facility: CLINIC | Age: 69
End: 2024-08-05
Payer: MEDICARE

## 2024-08-05 VITALS
HEIGHT: 65 IN | DIASTOLIC BLOOD PRESSURE: 54 MMHG | BODY MASS INDEX: 48.82 KG/M2 | SYSTOLIC BLOOD PRESSURE: 119 MMHG | WEIGHT: 293 LBS | HEART RATE: 80 BPM

## 2024-08-05 DIAGNOSIS — I35.0 NONRHEUMATIC AORTIC VALVE STENOSIS: ICD-10-CM

## 2024-08-05 DIAGNOSIS — I10 HYPERTENSION, ESSENTIAL: Primary | ICD-10-CM

## 2024-08-05 DIAGNOSIS — E78.2 HYPERLIPEMIA, MIXED: ICD-10-CM

## 2024-08-05 DIAGNOSIS — I50.32 DIASTOLIC CHF, CHRONIC: ICD-10-CM

## 2024-08-05 PROCEDURE — 3074F SYST BP LT 130 MM HG: CPT | Performed by: SPECIALIST

## 2024-08-05 PROCEDURE — 1160F RVW MEDS BY RX/DR IN RCRD: CPT | Performed by: SPECIALIST

## 2024-08-05 PROCEDURE — 1159F MED LIST DOCD IN RCRD: CPT | Performed by: SPECIALIST

## 2024-08-05 PROCEDURE — 3078F DIAST BP <80 MM HG: CPT | Performed by: SPECIALIST

## 2024-08-05 PROCEDURE — 99214 OFFICE O/P EST MOD 30 MIN: CPT | Performed by: SPECIALIST

## 2024-08-05 RX ORDER — SEMAGLUTIDE 1.34 MG/ML
1 INJECTION, SOLUTION SUBCUTANEOUS
COMMUNITY
Start: 2024-07-19

## 2024-08-05 RX ORDER — HYDROCODONE BITARTRATE AND ACETAMINOPHEN 7.5; 325 MG/1; MG/1
1 TABLET ORAL
COMMUNITY
Start: 2024-07-01

## 2024-08-05 RX ORDER — ACETAMINOPHEN AND CODEINE PHOSPHATE 300; 30 MG/1; MG/1
TABLET ORAL
COMMUNITY
Start: 2024-06-12

## 2024-08-05 RX ORDER — ALBUTEROL SULFATE 90 UG/1
AEROSOL, METERED RESPIRATORY (INHALATION)
COMMUNITY
Start: 2024-07-15

## 2025-01-29 ENCOUNTER — TELEPHONE (OUTPATIENT)
Dept: CARDIOLOGY | Facility: CLINIC | Age: 70
End: 2025-01-29
Payer: MEDICARE

## 2025-01-29 NOTE — TELEPHONE ENCOUNTER
[facility] received a fax that requires your attention. The document has been indexed to the patient’s chart for your review.      Reason for sending: EXTERNAL MEDICAL RECORD NOTIFICATION     Documents Description:   MED REC [id number]  [id number]    Name of Sender: [employer]     Date Indexed: 1.28.25

## 2025-02-19 NOTE — PROGRESS NOTES
[facility]  Cardiology progress Note    Patient Name: Emperatriz Mccarthy  : 1955    CHIEF COMPLAINT  Hypertension        Subjective   Subjective     HISTORY OF PRESENT ILLNESS    Emperatriz Mccarthy is a 69 y.o. female with history of hypertension.  No chest pain.    REVIEW OF SYSTEMS    Constitutional:    No fever, no weight loss  Skin:     No rash  Otolaryngeal:    No difficulty swallowing  Cardiovascular: See HPI.  Pulmonary:    No cough, no sputum production    Personal History     Social History:    reports that she has never smoked. She has never used smokeless tobacco. She reports that she does not drink alcohol and does not use drugs.    Home Medications:  Current Outpatient Medications on File Prior to Visit   Medication Sig    acetaminophen-codeine (TYLENOL with CODEINE #3) 300-30 MG per tablet     allopurinol (ZYLOPRIM) 300 MG tablet Take 1 tablet by mouth 2 (Two) Times a Day.    amitriptyline (ELAVIL) 10 MG tablet Take 1 tablet by mouth Every Night.    aspirin 81 MG EC tablet Take 1 tablet by mouth Daily.    atorvastatin (LIPITOR) 20 MG tablet Take 1 tablet by mouth Daily.    dilTIAZem CD (CARDIZEM CD) 240 MG 24 hr capsule Take 1 capsule by mouth Daily.    escitalopram (LEXAPRO) 20 MG tablet Take 1 tablet by mouth Daily.    glipizide (GLUCOTROL) 10 MG tablet Take 1 tablet by mouth 2 (Two) Times a Day Before Meals.    levothyroxine (SYNTHROID, LEVOTHROID) 25 MCG tablet Take 1 tablet by mouth Daily.    lisinopril (PRINIVIL,ZESTRIL) 5 MG tablet Take 1 tablet by mouth Daily.    montelukast (SINGULAIR) 10 MG tablet Take 1 tablet by mouth Every Night.    oxyCODONE-acetaminophen (PERCOCET) 7.5-325 MG per tablet Take 1 tablet by mouth Every 8 (Eight) Hours As Needed.    Ozempic, 1 MG/DOSE, 4 MG/3ML solution pen-injector Inject 1 Units under the skin into the appropriate area as directed. (Patient taking differently: Inject 2 Units under the skin into the appropriate area as directed 1 (One) Time Per  Week.)    SITagliptin (JANUVIA) 100 MG tablet Take 1 tablet by mouth Daily.    torsemide (DEMADEX) 100 MG tablet Take 0.5 tablets by mouth Daily.    traZODone (DESYREL) 100 MG tablet Take 1 tablet by mouth Every Night.    Ventolin  (90 Base) MCG/ACT inhaler     vitamin B-12 (CYANOCOBALAMIN) 1000 MCG tablet Take 1 tablet by mouth Daily.    Voltaren 1 % gel gel apply 2 grams up to 4 x a day prn to affected areas    [DISCONTINUED] HYDROcodone-acetaminophen (NORCO) 7.5-325 MG per tablet Take 1 tablet by mouth. (Patient not taking: Reported on 3/3/2025)     No current facility-administered medications on file prior to visit.       Past Medical History:   Diagnosis Date    Asthma     Degeneration of lumbar intervertebral disc 12/07/2017    Diabetes mellitus     Heart murmur     Hyperlipidemia     Hypertension     Leg paresthesia 12/07/2017    Leg weakness, bilateral 12/07/2017    Lumbago 12/07/2017    Low back pain    Lumbar spinal stenosis 12/07/2017    severe at L3/4    Palpitations 03/13/2017    Pre-op examination 12/07/2017    Sleep apnea        Allergies:  No Known Allergies    Objective    Objective       Vitals:   BP: (119)/(54) 119/54  Body mass index is 53.95 kg/m².     PHYSICAL EXAM:    General Appearance:   well developed  well nourished  HENT:   oropharynx moist  lips not cyanotic  Neck:  thyroid not enlarged  supple  Respiratory:  no respiratory distress  normal breath sounds  no rales  Cardiovascular:  no jugular venous distention  regular rhythm  apical impulse normal  S1 normal, S2 normal  no S3, no S4   no murmur  no rub, no thrill  carotid pulses normal; no bruit  pedal pulses normal  lower extremity edema: none    Skin:   warm, dry  Psychiatric:  judgement and insight appropriate  normal mood and affect        Result Review:  I have personally reviewed the available results from  [x]  Laboratory  [x]  EKG  [x]  Cardiology  [x]  Medications  [x]  Old records  []  Other:     Procedures          Impression/Plan:  1.  Essential hypertension controlled: Continue lisinopril 5 mg once a day.  Continue Cardizem  mg once a day.  Monitor blood pressure regularly.  2.  Moderate aortic stenosis: Asymptomatic.  Recent echo shows mean gradient of 25 mm.  No significant change from previous echo.  3.  Chronic diastolic heart failure: Continue Demadex 50 mg once a day.  Monitor renal function.  4.  Mixed hyperlipidemia: Continue Lipitor 20 mg once a day.  Monitor lipid and hepatic profile.              Gerardo Doty MD   03/03/25   13:32 EST

## 2025-03-03 ENCOUNTER — OFFICE VISIT (OUTPATIENT)
Dept: CARDIOLOGY | Facility: CLINIC | Age: 70
End: 2025-03-03
Payer: MEDICARE

## 2025-03-03 VITALS — WEIGHT: 293 LBS | BODY MASS INDEX: 48.82 KG/M2 | HEIGHT: 65 IN

## 2025-03-03 DIAGNOSIS — I50.32 DIASTOLIC CHF, CHRONIC: ICD-10-CM

## 2025-03-03 DIAGNOSIS — I35.0 NONRHEUMATIC AORTIC VALVE STENOSIS: ICD-10-CM

## 2025-03-03 DIAGNOSIS — I10 HYPERTENSION, ESSENTIAL: Primary | ICD-10-CM

## 2025-03-03 DIAGNOSIS — E78.2 HYPERLIPEMIA, MIXED: ICD-10-CM

## 2025-03-03 PROCEDURE — 1159F MED LIST DOCD IN RCRD: CPT | Performed by: SPECIALIST

## 2025-03-03 PROCEDURE — 1160F RVW MEDS BY RX/DR IN RCRD: CPT | Performed by: SPECIALIST

## 2025-03-03 PROCEDURE — 99214 OFFICE O/P EST MOD 30 MIN: CPT | Performed by: SPECIALIST

## 2025-03-03 RX ORDER — OXYCODONE AND ACETAMINOPHEN 7.5; 325 MG/1; MG/1
1 TABLET ORAL EVERY 8 HOURS PRN
COMMUNITY